# Patient Record
Sex: FEMALE | Race: WHITE | Employment: STUDENT | URBAN - METROPOLITAN AREA
[De-identification: names, ages, dates, MRNs, and addresses within clinical notes are randomized per-mention and may not be internally consistent; named-entity substitution may affect disease eponyms.]

---

## 2017-05-04 ENCOUNTER — HOSPITAL ENCOUNTER (EMERGENCY)
Facility: CLINIC | Age: 22
Discharge: HOME | End: 2017-05-05
Attending: EMERGENCY MEDICINE | Admitting: EMERGENCY MEDICINE
Payer: COMMERCIAL

## 2017-05-04 VITALS
RESPIRATION RATE: 16 BRPM | DIASTOLIC BLOOD PRESSURE: 79 MMHG | OXYGEN SATURATION: 98 % | HEART RATE: 82 BPM | SYSTOLIC BLOOD PRESSURE: 111 MMHG | TEMPERATURE: 97.3 F

## 2017-05-04 DIAGNOSIS — R53.83 OTHER FATIGUE: ICD-10-CM

## 2017-05-04 DIAGNOSIS — R63.5 WEIGHT GAIN: ICD-10-CM

## 2017-05-04 DIAGNOSIS — R79.89 LFT ELEVATION: ICD-10-CM

## 2017-05-04 PROCEDURE — 99282 EMERGENCY DEPT VISIT SF MDM: CPT

## 2017-05-04 ASSESSMENT — ENCOUNTER SYMPTOMS
HEADACHES: 1
ARTHRALGIAS: 1
NAUSEA: 0
VOMITING: 0
DIARRHEA: 0
DIZZINESS: 1
FEVER: 0
CONFUSION: 1

## 2017-05-04 NOTE — ED AVS SNAPSHOT
[facility] Emergency Department    [address]    [facility] [address]    Phone:  [phone]    Fax:  [phone]                                       Keren Villarreal   MRN: [id number]    Department:  [facility] Emergency Department   Date of Visit:  5/4/2017           After Visit Summary Signature Page     I have received my discharge instructions, and my questions have been answered. I have discussed any challenges I see with this plan with the nurse or doctor.    ..........................................................................................................................................  Patient/Patient Representative Signature      ..........................................................................................................................................  Patient Representative Print Name and Relationship to Patient    ..................................................               ................................................  Date                                            Time    ..........................................................................................................................................  Reviewed by Signature/Title    ...................................................              ..............................................  Date                                                            Time

## 2017-05-04 NOTE — ED AVS SNAPSHOT
[facility] Emergency Department    [address]    [facility] [address]    Phone:  [phone]    Fax:  [phone]                                       Keren Villarreal   MRN: [id number]    Department:  [facility] Emergency Department   Date of Visit:  5/4/2017           Patient Information     Date Of Birth          1995        Your diagnoses for this visit were:     Weight gain     LFT elevation Minimal    Other fatigue        You were seen by Mikel Zhou MD.      Follow-up Information     Follow up with Clinic, [facility].    Why:  As needed    Contact information:    [address] [facility] [address]  [phone]        Discharge References/Attachments     FATIGUE, MANAGING (ENGLISH)      24 Hour Appointment Hotline       To make an appointment at any [facility], call [phone] ([phone]). If you don't have a family doctor or clinic, we will help you find one. [facility] are conveniently located to serve the needs of you and your family.             Review of your medicines      Our records show that you are taking the medicines listed below. If these are incorrect, please call your family doctor or clinic.        Dose / Directions Last dose taken    AMBIEN PO   Dose:  10 mg        Take 10 mg by mouth nightly as needed   Refills:  0        XANAX PO   Dose:  0.25 mg        Take 0.25 mg by mouth 3 times daily as needed   Refills:  0                Orders Needing Specimen Collection     None      Pending Results     No orders found for last 3 day(s).            Pending Culture Results     No orders found for last 3 day(s).            Pending Results Instructions     If you had any lab results that were not finalized at the time of your Discharge, you can call the ED Lab Result RN at [phone]. You will be contacted by this team for any positive Lab results or changes in treatment. The nurses are available 7 days a  week from 10A to 6:30P.  You can leave a message 24 hours per day and they will return your call.        Test Results From Your Hospital Stay               Clinical Quality Measure: Blood Pressure Screening     Your blood pressure was checked while you were in the emergency department today. The last reading we obtained was  BP: 111/79 . Please read the guidelines below about what these numbers mean and what you should do about them.  If your systolic blood pressure (the top number) is less than 120 and your diastolic blood pressure (the bottom number) is less than 80, then your blood pressure is normal. There is nothing more that you need to do about it.  If your systolic blood pressure (the top number) is 120-139 or your diastolic blood pressure (the bottom number) is 80-89, your blood pressure may be higher than it should be. You should have your blood pressure rechecked within a year by a primary care provider.  If your systolic blood pressure (the top number) is 140 or greater or your diastolic blood pressure (the bottom number) is 90 or greater, you may have high blood pressure. High blood pressure is treatable, but if left untreated over time it can put you at risk for heart attack, stroke, or kidney failure. You should have your blood pressure rechecked by a primary care provider within the next 4 weeks.  If your provider in the emergency department today gave you specific instructions to follow-up with your doctor or provider even sooner than that, you should follow that instruction and not wait for up to 4 weeks for your follow-up visit.        Thank you for choosing [facility]       Thank you for choosing [facility] for your care. Our goal is always to provide you with excellent care. Hearing back from our patients is one way we can continue to improve our services. Please take a few minutes to complete the written survey that you may receive in the mail after you visit with us. Thank you!        Mikey  "Information     [employer] lets you send messages to your doctor, view your test results, renew your prescriptions, schedule appointments and more. To sign up, go to [email or web address] . Click on \"Log in\" on the left side of the screen, which will take you to the Welcome page. Then click on \"Sign up Now\" on the right side of the page.     You will be asked to enter the access code listed below, as well as some personal information. Please follow the directions to create your username and password.     Your access code is: [email or web address]  Expires: 8/3/2017 12:16 AM     Your access code will  in 90 days. If you need help or a new code, please call your [facility] clinic or [phone].        Care EveryWhere ID     This is your Care EveryWhere ID. This could be used by other organizations to access your [facility] medical records  [id number]        After Visit Summary       This is your record. Keep this with you and show to your community pharmacist(s) and doctor(s) at your next visit.                  "

## 2017-05-05 ASSESSMENT — ENCOUNTER SYMPTOMS: ABDOMINAL PAIN: 1

## 2017-05-05 NOTE — ED NOTES
Pt c/o    1. abd pain  2. Leg pain  3. Joint pain  4. LFT's being off  5. Has seen clinic lately    Pt A&O x 3, CMS x 3, ABCD's adequate in triage

## 2017-05-05 NOTE — ED PROVIDER NOTES
History     Chief Complaint:  Abnormal Labs     HPI   Keren Villarreal is a 22 year old female who presents for evaluation of abnormal labs. Over the past two months, the patient has had unintentional weight gain of 20 pounds. She has also had intermittent episodes of headaches, confusion, dizziness, abdominal pain, and joint pains over this period of time. She was evaluated on 5/2/2017 and 5/4 regarding these symptoms at [facility], where she had a lab workup with results as below. She presents to the ED with concern about her abnormal liver studies and how they may be related to her recent symptoms. She denies any significant alcohol use or illegal drug use. She does not believe that she is pregnant. She has not started any new nutritional supplements. Otherwise, she has not had any fever, nausea, vomiting, or diarrhea in association with her recent symptoms.     Labs 5/4/2017:  Liver studies: Bilirubin total 0.6, Alanine aminotransferase 59 high, Aspartate aminotransferase 31, Alk phos 62, Bilirubin direct 0.2, Gamma-glutamyl transferase 40 high     Labs 5/2/2017:   CBC: WBC 8.0, HGB 13.1,    Chem common: WNL (Creatinine 0.60)  Chem glucose: 84  TSH: 1.38     Allergies:  NKDA     Medications:    Ambien  Xanax     Past Medical History:    Anxiety  Depressive disorder   Bulimia   History of suicide attempt     Past Surgical History:    History reviewed. No pertinent past surgical history.     Family History:    History reviewed. No pertinent family history.     Social History:  Tobacco use:    Former smoker  Alcohol use:    Positive  Marital status:    Single   Accompanied to ED by:  Father      Review of Systems   Constitutional: Negative for fever.   Gastrointestinal: Positive for abdominal pain. Negative for diarrhea, nausea and vomiting.   Musculoskeletal: Positive for arthralgias.   Neurological: Positive for dizziness and headaches.   Psychiatric/Behavioral: Positive for confusion.   All other  systems reviewed and are negative.    Physical Exam   First Vitals:  BP: 111/79  Pulse: 82  Temp: 97.3  F (36.3  C)  Resp: 16  SpO2: 98 %      Physical Exam  Nursing note and vitals reviewed.  Constitutional: Cooperative.   HENT:   Mouth/Throat: Mucous membranes are normal.   Cardiovascular: Normal rate, regular rhythm and normal heart sounds.  No murmur.  Pulmonary/Chest: Effort normal and breath sounds normal. No respiratory distress. No wheezes. No rales.   Abdominal: Soft. Normal appearance and bowel sounds are normal. No distension. There is no tenderness. There is no rigidity and no guarding.   Neurological: Alert. Oriented x4  Skin: Skin is warm and dry. No rash noted.   Psychiatric: Normal mood and affect.     Emergency Department Course     Emergency Department Course:  Nursing notes and vitals reviewed.  2346: I performed an exam of the patient as documented above.     0013: I updated and reassessed the patient.     Findings and plan explained to the Patient. Patient discharged home with instructions regarding supportive care, medications, and reasons to return. The importance of close follow-up was reviewed.     Impression & Plan      Medical Decision Making:  Keren Villarreal is a 22 year old female who presents with multiple complaints including weight gain, leg pain, and abdominal pain. She is being worked up as an outpatient for this and came in mainly concerned that her liver function tests were elevated. This is very minor with only a slight elevation in her ALT. She has a reassuring exam. No fevers or concern for underlying malignancy. No indication for advanced imaging at this time. She will be discharged home in stable condition.     Diagnosis:    ICD-10-CM   1. Weight gain R63.5   2. LFT elevation, minimal  R79.89   3. Other fatigue R53.83       Disposition:  Discharged to home.       I, James Do, am serving as a scribe at 11:46 PM on 5/4/2017 to document services personally performed by  Dr. Zhou, based on my observations and the provider's statements to me.    [facility] EMERGENCY DEPARTMENT       Mikel Zhou MD  05/05/17 5793

## 2025-03-08 ENCOUNTER — NURSE TRIAGE (OUTPATIENT)
Dept: NURSING | Facility: CLINIC | Age: 30
End: 2025-03-08
Payer: COMMERCIAL

## 2025-03-09 NOTE — TELEPHONE ENCOUNTER
Pt is calling with concerns of a pilonidal cyst, size of corn/pea has popped and bleeding off and on. Located lower back, tailbone.   Pt have dizziness for couple of hours now. Started drinking water the last 45 mins ago. Does feel like the room spinning.  Denies headaches  Pt thinks cyst and dizziness may be correlated.    Pt reports Last couple of days, pt is eating less sugar than she normally do. Thinks dizziness might be related to dehydration. Has experience dizziness before in the past due to dehydration    Triage to home care, care advice given.    Aliza Saavedra, RN, BSN  3/8/2025 at 10:29 PM  [facility]        Reason for Disposition   Dizziness caused by not drinking enough fluids    Additional Information   Negative: SEVERE difficulty breathing (e.g., struggling for each breath, speaks in single words)   Negative: [1] Difficulty breathing or swallowing AND [2] started suddenly after medicine, an allergic food or bee sting   Negative: Shock suspected (e.g., cold/pale/clammy skin, too weak to stand, low BP, rapid pulse)   Negative: Difficult to awaken or acting confused (e.g., disoriented, slurred speech)   Negative: [1] Weakness (i.e., paralysis, loss of muscle strength) of the face, arm or leg on one side of the body AND [2] sudden onset AND [3] present now   Negative: [1] Numbness (i.e., loss of sensation) of the face, arm or leg on one side of the body AND [2] sudden onset AND [3] present now   Negative: [1] Loss of speech or garbled speech AND [2] sudden onset AND [3] present now   Negative: Overdose (accidental or intentional) of medications   Negative: [1] Fainted > 15 minutes ago AND [2] still feels too weak or dizzy to stand   Negative: Heart beating < 50 beats per minute OR > 140 beats per minute   Negative: Sounds like a life-threatening emergency to the triager   Negative: Difficulty breathing   Negative: Chest pain   Negative: Rectal bleeding, bloody stool, or tarry-black stool    "Negative: [1] Vomiting AND [2] contains red blood or black (\"coffee ground\") material   Negative: Vomiting is main symptom   Negative: Headache is main symptom   Negative: Diarrhea is main symptom   Negative: Patient states that they are having an anxiety or panic attack   Negative: Dizziness from low blood sugar (i.e., < 60 mg/dl or 3.5 mmol/l)   Negative: Dizziness is described as a spinning sensation (i.e., vertigo)   Negative: Heat exhaustion suspected (i.e., dehydration from heat exposure)   Negative: SEVERE dizziness (e.g., unable to stand, requires support to walk, feels like passing out now)   Negative: Extra heartbeats, irregular heart beating, or heart is beating very fast  (i.e., \"palpitations\")   Negative: [1] Drinking very little AND [2] dehydration suspected (e.g., no urine > 12 hours, very dry mouth, very lightheaded)   Negative: [1] Weakness (i.e., paralysis, loss of muscle strength) of the face, arm / hand, or leg / foot on one side of the body AND [2] sudden onset AND [3] brief (now gone)   Negative: [1] Numbness (i.e., loss of sensation) of the face, arm / hand, or leg / foot on one side of the body AND [2] sudden onset AND [3] brief (now gone)   Negative: [1] Loss of speech or garbled speech AND [2] sudden onset AND [3] brief (now gone)   Negative: Loss of vision or double vision  (Exception: Similar to previous migraines.)   Negative: Patient sounds very sick or weak to the triager   Negative: [1] Dizziness caused by heat exposure, sudden standing, or poor fluid intake AND [2] no improvement after 2 hours of rest and fluids   Negative: [1] Fever > 103 F (39.4 C) AND [2] not able to get the fever down using Fever Care Advice   Negative: [1] Fever > 101 F (38.3 C) AND [2] age > 60 years   Negative: [1] Fever > 100.0 F (37.8 C) AND [2] bedridden (e.g., CVA, chronic illness, recovering from surgery)   Negative: [1] Fever > 100.0 F (37.8 C) AND [2] diabetes mellitus or weak immune system (e.g., HIV " positive, cancer chemo, splenectomy, organ transplant, chronic steroids)   Negative: [1] MODERATE dizziness (e.g., interferes with normal activities) AND [2] has NOT been evaluated by doctor (or NP/PA) for this  (Exception: Dizziness caused by heat exposure, sudden standing, or poor fluid intake.)   Negative: Fever present > 3 days (72 hours)   Negative: Taking a medicine that could cause dizziness (e.g., blood pressure medications, diuretics)   Negative: [1] MODERATE dizziness (e.g., interferes with normal activities) AND [2] has been evaluated by doctor (or NP/PA) for this   Negative: [1] MILD dizziness (e.g., walking normally) AND [2] has NOT been evaluated by doctor (or NP/PA) for this  (Exception: Dizziness caused by heat exposure, sudden standing, or poor fluid intake.)   Negative: Substance use (drug use) or unhealthy alcohol use, known or suspected   Negative: [1] MILD dizziness (e.g., walking normally) AND [2] has been evaluated by doctor (or NP/PA) for this   Negative: Dizziness is a chronic symptom (recurrent or ongoing AND present > 4 weeks)    Protocols used: Dizziness - [id number]

## 2025-03-11 ENCOUNTER — APPOINTMENT (OUTPATIENT)
Dept: CT IMAGING | Facility: CLINIC | Age: 30
End: 2025-03-11
Attending: EMERGENCY MEDICINE
Payer: COMMERCIAL

## 2025-03-11 ENCOUNTER — HOSPITAL ENCOUNTER (EMERGENCY)
Facility: CLINIC | Age: 30
Discharge: HOME | End: 2025-03-11
Attending: EMERGENCY MEDICINE | Admitting: EMERGENCY MEDICINE
Payer: COMMERCIAL

## 2025-03-11 VITALS
OXYGEN SATURATION: 99 % | RESPIRATION RATE: 18 BRPM | HEART RATE: 72 BPM | SYSTOLIC BLOOD PRESSURE: 101 MMHG | TEMPERATURE: 98.1 F | WEIGHT: 143.74 LBS | HEIGHT: 63 IN | BODY MASS INDEX: 25.47 KG/M2 | DIASTOLIC BLOOD PRESSURE: 65 MMHG

## 2025-03-11 DIAGNOSIS — S06.0X0A CONCUSSION WITHOUT LOSS OF CONSCIOUSNESS, INITIAL ENCOUNTER: ICD-10-CM

## 2025-03-11 DIAGNOSIS — R42 VERTIGO: ICD-10-CM

## 2025-03-11 LAB
HCG SERPL QL: NEGATIVE
HOLD SPECIMEN: NORMAL

## 2025-03-11 PROCEDURE — 70450 CT HEAD/BRAIN W/O DYE: CPT

## 2025-03-11 PROCEDURE — 96374 THER/PROPH/DIAG INJ IV PUSH: CPT | Performed by: EMERGENCY MEDICINE

## 2025-03-11 PROCEDURE — 84703 CHORIONIC GONADOTROPIN ASSAY: CPT | Performed by: EMERGENCY MEDICINE

## 2025-03-11 PROCEDURE — 36415 COLL VENOUS BLD VENIPUNCTURE: CPT | Performed by: EMERGENCY MEDICINE

## 2025-03-11 PROCEDURE — 250N000011 HC RX IP 250 OP 636: Performed by: EMERGENCY MEDICINE

## 2025-03-11 PROCEDURE — 99285 EMERGENCY DEPT VISIT HI MDM: CPT | Mod: 25 | Performed by: EMERGENCY MEDICINE

## 2025-03-11 PROCEDURE — 96375 TX/PRO/DX INJ NEW DRUG ADDON: CPT | Performed by: EMERGENCY MEDICINE

## 2025-03-11 RX ORDER — MECLIZINE HCL 12.5 MG 12.5 MG/1
12.5 TABLET ORAL 4 TIMES DAILY PRN
Qty: 30 TABLET | Refills: 0 | Status: SHIPPED | OUTPATIENT
Start: 2025-03-11

## 2025-03-11 RX ORDER — ONDANSETRON 4 MG/1
4 TABLET, ORALLY DISINTEGRATING ORAL EVERY 8 HOURS PRN
Qty: 10 TABLET | Refills: 0 | Status: SHIPPED | OUTPATIENT
Start: 2025-03-11 | End: 2025-03-14

## 2025-03-11 RX ORDER — METOCLOPRAMIDE HYDROCHLORIDE 5 MG/ML
5 INJECTION INTRAMUSCULAR; INTRAVENOUS ONCE
Status: COMPLETED | OUTPATIENT
Start: 2025-03-11 | End: 2025-03-11

## 2025-03-11 RX ORDER — DIPHENHYDRAMINE HYDROCHLORIDE 50 MG/ML
25 INJECTION, SOLUTION INTRAMUSCULAR; INTRAVENOUS ONCE
Status: COMPLETED | OUTPATIENT
Start: 2025-03-11 | End: 2025-03-11

## 2025-03-11 RX ORDER — KETOROLAC TROMETHAMINE 15 MG/ML
15 INJECTION, SOLUTION INTRAMUSCULAR; INTRAVENOUS ONCE
Status: COMPLETED | OUTPATIENT
Start: 2025-03-11 | End: 2025-03-11

## 2025-03-11 RX ADMIN — DIPHENHYDRAMINE HYDROCHLORIDE 25 MG: 50 INJECTION, SOLUTION INTRAMUSCULAR; INTRAVENOUS at 13:04

## 2025-03-11 RX ADMIN — KETOROLAC TROMETHAMINE 15 MG: 15 INJECTION, SOLUTION INTRAMUSCULAR; INTRAVENOUS at 13:04

## 2025-03-11 RX ADMIN — METOCLOPRAMIDE 5 MG: 5 INJECTION, SOLUTION INTRAMUSCULAR; INTRAVENOUS at 13:04

## 2025-03-11 ASSESSMENT — COLUMBIA-SUICIDE SEVERITY RATING SCALE - C-SSRS
1. IN THE PAST MONTH, HAVE YOU WISHED YOU WERE DEAD OR WISHED YOU COULD GO TO SLEEP AND NOT WAKE UP?: NO
2. HAVE YOU ACTUALLY HAD ANY THOUGHTS OF KILLING YOURSELF IN THE PAST MONTH?: NO
6. HAVE YOU EVER DONE ANYTHING, STARTED TO DO ANYTHING, OR PREPARED TO DO ANYTHING TO END YOUR LIFE?: NO

## 2025-03-11 ASSESSMENT — VISUAL ACUITY
OD: 20/25;WITHOUT CORRECTIVE LENSES
OS: 20/20;WITHOUT CORRECTIVE LENSES

## 2025-03-11 ASSESSMENT — ACTIVITIES OF DAILY LIVING (ADL)
ADLS_ACUITY_SCORE: 41
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
## 2025-03-11 NOTE — DISCHARGE INSTRUCTIONS
Ibuprofen 600mg every 6 hours for headache  Tylenol 1000mg every 8 hours for pain  Zofran for nausea  Meclizine for vertigo  Push fluids and rest. Avoid caffeine and alcohol  See your doctor for concussion management and referral    Discharge Instructions  Concussion    You were seen today for signs of a concussion.  The symptoms will vary, depending on the nature of your injury and your health. You may have: headache, confusion, nausea (feel sick to your stomach), vomiting (throwing up) and problems with memory, concentrating, or sleep. You may feel dizzy, irritable, and tired. Children and teens may need help from their parents, teachers, and coaches to watch for symptoms as they recover.    Generally, every Emergency Department visit should have a follow-up clinic visit with either a primary or a specialty clinic/provider. Please follow-up as instructed by your emergency provider today.     Return to the Emergency Department if:  Your headache gets worse or you start to have a really bad headache even with the recommended treatment plan.   You feel drowsier, have growing confusion, or slurred speech.   You keep repeating yourself.   You have strange behavior or are feeling more irritable.   You have a seizure.   You vomit (throw up) more than once.   You have trouble walking.   You have weakness or numbness.  Your neck pain gets worse.   You have a loss of consciousness.   You have blood for fluid coming from your ears or nose.   You have new symptoms or anything that worries you.     Home Care:  Get lots of rest and get enough sleep at night. Take daytime naps or rest if you feel tired.   Limit physical activity and  thinking  activities. These can make symptoms worse.   Physical activities include gym, sports, weight training, running, exercise, and heavy lifting.   Thinking activities include homework, class work, job-related work, and screen time (phone, computer, tablet, TV, and video games).   Stick to a  healthy diet and drink lots of fluids. Avoid alcohol.  As symptoms improve, you may slowly return to your daily activities. If symptoms get worse or return, reduce your activity.   Know that it is normal to feel sad or frustrated when you do not feel right and are less active.     Going Back to Work:  Your care team will tell you when you are ready to return to work.    Limit the amount of work you do soon after your injury. This may speed healing. Take breaks if your symptoms get worse. You should also reduce your physical activity as well as activities that require a lot of thinking until you see your doctor. You may need shorter work days and a lighter workload.  Avoid heavy lifting, working with machinery, driving and working at heights until your symptoms are gone or you are cleared by a provider.    Going Back to School:  If you are still having symptoms, you may need extra help at school.  Tell your teachers and school nurse about your injury and symptoms. Ask them to watch for problems with learning, memory, and concentrating. Symptoms may get worse when you do schoolwork, and you may become more irritable. You may need shorter school days, a reduced workload, and to postpone testing.  Do not drive or take gym class (physical activity) until cleared by a provider.    Returning to Sports:  Never return to play if you have any symptoms. A full recovery will reduce the chances of getting hurt again. Remember, it is better to miss one or two games than a whole season.  You should rest from all physical activity until you see your provider. Generally, if all symptoms have completely cleared, your provider can help guide you to slowly return to sports. If symptoms return or worsen, stop the activity and see your provider.  Important: If you are in an organized sport and under age 18, you will need written consent from a healthcare provider before you return to sports. Typically, this will be your primary care or  sports medicine provider. Please make an appointment.    If you were given a prescription for medicine here today, be sure to read all of the information (including the package insert) that comes with your prescription.  This will include important information about the medicine, its side effects, and any warnings that you need to know about.  The pharmacist who fills the prescription can provide more information and answer questions you may have about the medicine.  If you have questions or concerns that the pharmacist cannot address, please call or return to the Emergency Department.     Remember that you can always come back to the Emergency Department if you are not able to see your regular provider in the amount of time listed above, if you get any new symptoms, or if there is anything that worries you.

## 2025-03-11 NOTE — Clinical Note
Keren Villarreal was seen and treated in our emergency department on 3/11/2025.  She may return to work on 03/17/2025.       If you have any questions or concerns, please don't hesitate to call.      Jack Bradshaw MD

## 2025-03-27 ENCOUNTER — THERAPY VISIT (OUTPATIENT)
Dept: OCCUPATIONAL THERAPY | Facility: CLINIC | Age: 30
End: 2025-03-27
Attending: EMERGENCY MEDICINE
Payer: COMMERCIAL

## 2025-03-27 DIAGNOSIS — S06.0X0A CONCUSSION WITHOUT LOSS OF CONSCIOUSNESS, INITIAL ENCOUNTER: ICD-10-CM

## 2025-03-27 PROCEDURE — 97165 OT EVAL LOW COMPLEX 30 MIN: CPT | Mod: GO | Performed by: OCCUPATIONAL THERAPY ASSISTANT

## 2025-03-27 PROCEDURE — 97535 SELF CARE MNGMENT TRAINING: CPT | Mod: GO | Performed by: OCCUPATIONAL THERAPY ASSISTANT

## 2025-03-27 ASSESSMENT — MONTREAL COGNITIVE ASSESSMENT (MOCA)
4. NAME EACH OF THE THREE ANIMALS SHOWN: 3
WHAT IS THE TOTAL SCORE (OUT OF 30): 28
WHAT LEVEL OF EDUCATION WAS ATTAINED: 0
7. [VIGILENCE] TAP WHEN HEARING DESIGNATED LETTER: 1
9. REPEAT EACH SENTENCE: 2
VISUOSPATIAL/EXECUTIVE SUBSCORE: 5
8. SERIAL SUBTRACTION OF 7S: 3
11. FOR EACH PAIR OF WORDS, WHAT CATEGORY DO THEY BELONG TO (OUT OF 2): 1
12. MEMORY INDEX SCORE: 4
13. ORIENTATION SUBSCORE: 6
6. READ LIST OF DIGITS [FORWARD/BACKWARD]: 2
10. [FLUENCY] NAME WORDS STARTING WITH DESIGNATED LETTER: 1

## 2025-03-27 NOTE — PROGRESS NOTES
OCCUPATIONAL THERAPY EVALUATION  Type of Visit: Evaluation        Fall Risk Screen:  Fall screen completed by: OT  Have you fallen 2 or more times in the past year?: No  Have you fallen and had an injury in the past year?: No  Is patient a fall risk?: No  Fall screen comments: Per OT Assessment    Subjective        Presenting condition or subjective complaint: Concussion therapy /management  Date of onset: 02/25/25 (DOI)    Relevant medical history: Concussions; Depression; Dizziness; Mental Illness; Migraines or headaches; Neck injury; Vision problems   Dates & types of surgery: August 2023- removal of pilonidal cyst , LASIK July 2021    Prior diagnostic imaging/testing results: CT scan     Prior therapy history for the same diagnosis, illness or injury: No      Prior Level of Function  Transfers: Independent  Ambulation: Independent  ADL: Independent  IADL: Driving, Finances, Housekeeping, Laundry, Meal preparation, Medication management, currently unemployed    Living Environment  Social support: With family members   Type of home: House   Stairs to enter the home: Yes 2 Is there a railing: No     Ramp: No   Stairs inside the home: Yes 13 Is there a railing: Yes     Help at home: None  Equipment owned:       Employment: No    Hobbies/Interests: Nature walks / music    Patient goals for therapy: Exercise, read, memory restoration, restored mobility    Pain assessment: Pain present  Location: neck and back/Rating: 3/10- ache      Objective   Vision Interview    Technology Use/Symptoms More sensitive to computer screen, I have turned down the brightness on my phone.    Glasses Lasix in 2021    Light Sensitivity/Glare Indoor, Outdoor   Impaired Vision Double vision, Impaired accommodation , will be further assessed- reports visual disturbance when feeling dizzy    Reading Brings close to read, reports difficulty reading book more than text messages.           Vestibular/Ocular Motor Test: Unable to complete VOMS due  "to time constraints and patient's increase in symptoms.      Not Tested Headache Dizziness Nausea Fogginess Comments   Baseline N/A 3 7 4 7    Smooth Pursuits N/A     Unable to complete due to dizziness.    Saccades-Horizontal  4 8 4 8 Completes slow, lots of eye blinkinng.    Saccades-Vertical  5 8 4 8 Completes slowly, unable to do complete repetitions.    Convergence (Near [place]) N/A     (Near [place] in CM)  Measure 1:   Measure 2:   Measure 3    VOR Horizontal N/A        VOR Vertical N/A        Visual Motion Sensitivity Test N/A              Difficulty with IADL Performance: Symptom Increase    Difficulty Concentrating at School, Work or Home Patient endorses difficulty concentrating to the point where she quit her job. She is unable to keep track in conversations, reporting \"zones out\" feels effortful.    Difficulty Multitasking/Planning Unable to complete successfully.    Busy/Dynamic Environments Unable to report as she states she avoids those areas.    Path Finding in Busy Environments Increased difficulty- seems dizzy.    Sensory Tolerance Light sensitivity,    Startles Easily Yes.         Mood Changes    Is Patient Experiencing Changes in Mood? Anxiety, Depression, Feeling irritable   Is Patient Currently Receiving Treatment for Mental Health? No is a goal; looking for one again.         Vestibular Symptoms    Is Patient Experiencing Vestibular Symptoms? Yes   Triggers for Vestibular Symptoms         Fatigue    General Fatigue ADL, Work        Cognitive Status Examination  Orientation: Oriented to person, place and time   Level of Consciousness: Alert, Lethargic/somnolent  Follows Commands and Answers Questions: 100% of the time, Follows multi step instructions  Personal Safety and Judgement: Intact  Memory: Impaired  Attention: Quiet environment required, Sustained attention impaired, Selective attention impaired, difficulty ignoring irrelevant stimuli, Alternating attention impaired, difficulty shifting " between tasks, Divided attention impaired, difficulty with simultaneous tasks, Difficulty with dual tasking   Organization/Problem Solving: Reports problems with problem solving during eval, Prioritizing of info/tasks impaired  Executive Function: Working memory impaired, decreased storage of information for performing tasks, Planning ability impaired, Self awareness/monitoring impaired    VISUAL SKILLS  Visual Acuity: No deficits identified  Visual Field: Appears normal  Visual Attention: Appears normal  Oculomotor: increased symptoms with screen, effortful, blinking, slowed    SENSATION: LE Sensation WNL    POSTURE: WFL  RANGE OF MOTION: UE AROM WFL  STRENGTH: UE Strength WFL  MUSCLE TONE: WFL  COORDINATION:  to be assessed in subsequent visits   BALANCE: WFL    FUNCTIONAL MOBILITY  Assistive Device(s): None      BED MOBILITY: WNL    TRANSFERS: WNL    BATHING: WNL    UPPER BODY DRESSING: WNL    LOWER BODY DRESSING: WNL    TOILETING: WNL    GROOMING: WNL    EATING/SELF FEEDING: WNL     ACTIVITY TOLERANCE: patient reports decreased from baseline; taking a lot of naps and feeling tired constantly     INSTRUMENTAL ACTIVITIES OF DAILY LIVING (IADL): Inddependent in all IADL, however recently quit job because of symptoms   Meal Planning/Prep: mod I    Home/Financial Management: Mod I   Communication/Computer Use: Mod I, however patient endorses symptoms with computer use   Community Mobility: Increased anxiety with driving, denies severe symptoms when driving.   Care of Others:- n/a    Assessment & Plan   CLINICAL IMPRESSIONS  Medical Diagnosis: Concussion with unknown loss of consciousness status, initial encounter (S06.0XAA)  - Primary    Treatment Diagnosis: decreased functional activity tolerance    Impression/Assessment: Pt is a 30 year old female presenting to Occupational Therapy due to IADL deficits stemming from concussion.  The following significant findings have been identified: Impaired activity tolerance,  Anxiety and/or fear, and Impaired sensory feedback.  These identified deficits interfere with their ability to perform work tasks as compared to previous level of function.     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  Clinical Decision Making (Complexity): Low complexity    PLAN OF CARE  Treatment Interventions:  Interventions: Cognitive Skills, Community/Work Reintegration, Self-Care/Home Management, Therapeutic Activity, Therapeutic Exercise, Neuromuscular Re-education    Long Term Goals   OT Goal 1  Goal Identifier: Habituation  Goal Description: Patient will identify 3 strategies to facilitate habituation to sensory sensitivity and decrease post-concussion symptoms score by 5 points or more on CSA for increased independence during ADL/IADLs  Rationale: In order to maximize independence with tasks requiring functional cognition/executive function for school, work, medication or financial mgmt  Target Date: 06/19/25  OT Goal 2  Goal Identifier: oculomotor HEP  Goal Description: patient will be modified independent in oculomotor HEP including but not limited to tegan string, 4 square saccades to reduce concussion related symptoms and to improve performance in vision-related ADL and IADL.  Rationale: In order to maximize safety and independence with ADL/IADLs  Target Date: 06/19/25  OT Goal 3  Goal Identifier: fatigue mgmt  Goal Description: Pt will verbalize and/or demonstration understanding of internal and external strategies to help with memory and attention (concentration)  into daily routine for improved ADL/IADL performance  Rationale: In order to maximize independence with tasks requiring functional cognition/executive function for school, work, medication or financial mgmt  Target Date: 06/19/25      Frequency of Treatment: 1x/week with ability to add or taper as  clinically indicated  Duration of Treatment: 6 weeks     Recommended Referrals to Other Professionals:  mental health professional, vestibular PT  Education Assessment: Learner/Method: Patient;Listening;Demonstration     Risks and benefits of evaluation/treatment have been explained.   Patient/Family/caregiver agrees with Plan of Care.     Evaluation Time:    OT Eval, Low Complexity Minutes (57072): 30      Signing Clinician: Jennifer Anderson OT

## 2025-04-07 ENCOUNTER — THERAPY VISIT (OUTPATIENT)
Dept: OCCUPATIONAL THERAPY | Facility: CLINIC | Age: 30
End: 2025-04-07
Attending: EMERGENCY MEDICINE
Payer: COMMERCIAL

## 2025-04-07 DIAGNOSIS — S06.0X0A CONCUSSION WITHOUT LOSS OF CONSCIOUSNESS, INITIAL ENCOUNTER: Primary | ICD-10-CM

## 2025-04-07 PROCEDURE — 97535 SELF CARE MNGMENT TRAINING: CPT | Mod: GO

## 2025-04-07 PROCEDURE — 97530 THERAPEUTIC ACTIVITIES: CPT | Mod: GO

## 2025-05-05 ENCOUNTER — THERAPY VISIT (OUTPATIENT)
Dept: OCCUPATIONAL THERAPY | Facility: CLINIC | Age: 30
End: 2025-05-05
Attending: EMERGENCY MEDICINE
Payer: COMMERCIAL

## 2025-05-05 DIAGNOSIS — S06.0X0A CONCUSSION WITHOUT LOSS OF CONSCIOUSNESS, INITIAL ENCOUNTER: Primary | ICD-10-CM

## 2025-05-05 PROCEDURE — 97530 THERAPEUTIC ACTIVITIES: CPT | Mod: GO

## 2025-05-05 PROCEDURE — 97535 SELF CARE MNGMENT TRAINING: CPT | Mod: GO

## 2025-05-12 ENCOUNTER — THERAPY VISIT (OUTPATIENT)
Dept: OCCUPATIONAL THERAPY | Facility: CLINIC | Age: 30
End: 2025-05-12
Attending: EMERGENCY MEDICINE
Payer: COMMERCIAL

## 2025-05-12 DIAGNOSIS — S06.0X0A CONCUSSION WITHOUT LOSS OF CONSCIOUSNESS, INITIAL ENCOUNTER: Primary | ICD-10-CM

## 2025-05-12 PROCEDURE — 97530 THERAPEUTIC ACTIVITIES: CPT | Mod: GO

## 2025-05-12 PROCEDURE — 97535 SELF CARE MNGMENT TRAINING: CPT | Mod: GO
